# Patient Record
Sex: MALE | Race: WHITE | NOT HISPANIC OR LATINO | URBAN - METROPOLITAN AREA
[De-identification: names, ages, dates, MRNs, and addresses within clinical notes are randomized per-mention and may not be internally consistent; named-entity substitution may affect disease eponyms.]

---

## 2021-08-23 ENCOUNTER — EMERGENCY (EMERGENCY)
Facility: HOSPITAL | Age: 61
LOS: 1 days | Discharge: NURSING, REHAB OR LONG-TERM CARE | End: 2021-08-23
Attending: EMERGENCY MEDICINE | Admitting: EMERGENCY MEDICINE
Payer: MEDICARE

## 2021-08-23 VITALS
DIASTOLIC BLOOD PRESSURE: 82 MMHG | RESPIRATION RATE: 18 BRPM | SYSTOLIC BLOOD PRESSURE: 135 MMHG | HEART RATE: 82 BPM | OXYGEN SATURATION: 99 %

## 2021-08-23 VITALS
TEMPERATURE: 98 F | RESPIRATION RATE: 15 BRPM | SYSTOLIC BLOOD PRESSURE: 137 MMHG | OXYGEN SATURATION: 100 % | DIASTOLIC BLOOD PRESSURE: 78 MMHG | HEART RATE: 90 BPM

## 2021-08-23 PROCEDURE — 99283 EMERGENCY DEPT VISIT LOW MDM: CPT

## 2021-08-23 RX ORDER — TERBINAFINE HCL 250 MG
1 TABLET ORAL
Qty: 30 | Refills: 0
Start: 2021-08-23 | End: 2021-09-21
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
## 2021-08-23 NOTE — ED PROVIDER NOTE - NS ED ROS FT
REVIEW OF SYSTEMS:    CONSTITUTIONAL: No weakness, fevers or chills  EYES/ENT: No visual changes;  No vertigo or throat pain   NECK: No pain or stiffness  BACK: no pain or lesions   RESPIRATORY: No cough, wheezing, hemoptysis; No shortness of breath  CARDIOVASCULAR: No chest pain or palpitations  GASTROINTESTINAL: No abdominal or epigastric pain. No nausea, vomiting, or hematemesis; No diarrhea or constipation. No melena or hematochezia.  GENITOURINARY: No dysuria, frequency or hematuria  NEUROLOGICAL: No numbness or weakness  EXTREMITIES: no varicose veins, no pain in UE and LE  SKIN: No itching. +rash on head
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
## 2021-08-23 NOTE — ED PROVIDER NOTE - PATIENT PORTAL LINK FT
You can access the FollowMyHealth Patient Portal offered by [facility] by registering at the following website: [email or web address]. By joining [employer]’s FollowMyHealth portal, you will also be able to view your health information using other applications (apps) compatible with our system.

## 2021-08-23 NOTE — ED ADULT NURSE NOTE - OBJECTIVE STATEMENT
Pt awake, alert - sent in from assisted facility for cynthia on top of head - on exam, cynthia does not look like a laceration or abrasion - pt denies any discomfort - awaiting MD ortiz and probable discharge Pt awake, alert - staff @ bedside - sent in from assisted facility for cynthia on top of head - on exam, cynthia does not look like a laceration or abrasion - pt denies any discomfort - awaiting MD ortiz and probable discharge

## 2021-08-23 NOTE — ED PROVIDER NOTE - CLINICAL SUMMARY MEDICAL DECISION MAKING FREE TEXT BOX
60 y/o M presenting with nonpruritic erythematous ring like lesion on head likely 2/2 tinea corpis. As lesion is on scalp will send pt home with 4 weeks of terbinafine and PCP f/u. 60 y/o M presenting with nonpruritic erythematous ring like lesion on head likely 2/2 tinea capitis. As lesion is on scalp will send pt home with 4 weeks of terbinafine and PCP f/u.  also d/w aid/staffmember, and documented on facility paperwork that pt should f/u c Derm as he may need further eval and tx to ensure resolution and r/o other etiologies.

## 2021-08-23 NOTE — ED PROVIDER NOTE - ATTENDING CONTRIBUTION TO CARE
Attending Attestation: Dr. Shannon  I have personally performed a history and physical examination of the patient and discussed management with the resident as well as the patient.  I reviewed the resident's note and agree with the documented findings and plan of care.  I have authored and modified critical sections of the Provider Note, including but not limited to HPI, Physical Exam and MDM. 60 y/o M presenting with nonpruritic erythematous ring like lesion on head likely 2/2 tinea capitis. As lesion is on scalp will send pt home with 4 weeks of terbinafine and PCP f/u.  also d/w aid/staffmember, and documented on facility paperwork that pt should f/u c Derm as he may need further eval and tx to ensure resolution and r/o other etiologies.

## 2021-08-23 NOTE — ED PROVIDER NOTE - CARE PLAN
1 Principal Discharge DX:	Rash  Assessment and plan of treatment:	Skin lesion/rash on head looks like ringworm, will treat with 4 weeks of terbinafine

## 2021-08-23 NOTE — ED PROVIDER NOTE - OBJECTIVE STATEMENT
62 y/o M with MR, hyperTG, GERD, hx of pancreatitis and gastritis presenting for rash on head. Facility that patient lives at noticed this rash this morning. Patient denies any pain or pruritis. He denies fevers, chills. 62 y/o M with MR, hyperTG, GERD, hx of pancreatitis and gastritis presenting for rash on head. Facility that patient lives at noticed this rash this morning. Patient denies any pain or pruritis. He denies fevers, chills.  Is in NAD, and unable to provide detailed hx d/t disbaility.

## 2021-08-23 NOTE — ED PROVIDER NOTE - PHYSICAL EXAMINATION
PHYSICAL EXAM:  T(C): 36.6 (08-23-21 @ 18:06), Max: 36.6 (08-23-21 @ 18:06)  HR: 82 (08-23-21 @ 20:43) (82 - 90)  BP: 135/82 (08-23-21 @ 20:43) (135/82 - 137/78)  RR: 18 (08-23-21 @ 20:43) (15 - 18)  SpO2: 99% (08-23-21 @ 20:43) (99% - 100%)    GENERAL: NAD, well-developed  HEAD:  small ring like lesion on head with some satellite ring like lesions, erythematous with dry skin on scalp  EYES: EOMI, conjunctiva and sclera clear  NECK: Supple, No JVD  CHEST/LUNG: Clear to auscultation bilaterally; No wheeze  HEART: Regular rate and rhythm; No murmurs, rubs, or gallops  ABDOMEN: Soft, Nontender, Nondistended; Bowel sounds present  EXTREMITIES:  2+ Peripheral Pulses, No clubbing, cyanosis, or edema  PSYCH: AAOx3  NEUROLOGY: non-focal  SKIN: rash as above

## 2021-08-23 NOTE — ED PROVIDER NOTE - NSFOLLOWUPINSTRUCTIONS_ED_ALL_ED_FT
You were seen in the Emergency Department for lesion on head.     This may be due to a ring worm infection. Please take your antifungal medication once a day for 4 weeks and follow up with your PCP.    1) Advance activity as tolerated.   2) Continue all previously prescribed medications as directed.    3) Follow up with your primary care physician in 24-48 hours - take copies of your results.    4) Return to the Emergency Department for worsening or persistent symptoms, and/or ANY NEW OR CONCERNING SYMPTOMS.

## 2021-09-25 ENCOUNTER — EMERGENCY (EMERGENCY)
Facility: HOSPITAL | Age: 61
LOS: 1 days | Discharge: HOME | End: 2021-09-25
Attending: EMERGENCY MEDICINE | Admitting: EMERGENCY MEDICINE
Payer: MEDICARE

## 2021-09-25 VITALS
SYSTOLIC BLOOD PRESSURE: 161 MMHG | DIASTOLIC BLOOD PRESSURE: 92 MMHG | OXYGEN SATURATION: 100 % | TEMPERATURE: 98 F | HEART RATE: 68 BPM | RESPIRATION RATE: 17 BRPM

## 2021-09-25 VITALS
SYSTOLIC BLOOD PRESSURE: 128 MMHG | RESPIRATION RATE: 16 BRPM | DIASTOLIC BLOOD PRESSURE: 74 MMHG | HEART RATE: 61 BPM | TEMPERATURE: 98 F | OXYGEN SATURATION: 100 %

## 2021-09-25 PROCEDURE — 99284 EMERGENCY DEPT VISIT MOD MDM: CPT

## 2021-09-25 NOTE — ED ADULT NURSE NOTE - OBJECTIVE STATEMENT
Pt a&ox3 from [facility] for one episode of vomiting while eating dinner. Staff at bedside. Pt abdomen soft, non distended, non tender. Pt offers no complaints. PO challenge then DC as per MD

## 2021-09-25 NOTE — ED ADULT NURSE NOTE - NSIMPLEMENTINTERV_GEN_ALL_ED
Implemented All Universal Safety Interventions:  [place] to call system. Call bell, personal items and telephone within reach. Instruct patient to call for assistance. Room bathroom lighting operational. Non-slip footwear when patient is off stretcher. Physically safe environment: no spills, clutter or unnecessary equipment. Stretcher in lowest position, wheels locked, appropriate side rails in place.

## 2021-09-25 NOTE — ED PROVIDER NOTE - OBJECTIVE STATEMENT
60 y/o M with MR, hyperTG, GERD, hx of pancreatitis and gastritis presenting for nausea/vomiting during dinner tonight, lasted a few minutes but spontaneously resolved. since then abdomen feeling well, no fevers/chills, urinary symptoms, tolerating PO.

## 2021-09-25 NOTE — ED PROVIDER NOTE - PRO INTERPRETER NEED 2
Last office visit: 10/14/19    No upcoming visit     Last filled on 9/9/19    Patient uses prn for migraines.     Ok to refill, please advise   English

## 2021-09-25 NOTE — ED PROVIDER NOTE - ATTENDING CONTRIBUTION TO CARE
Attending note:   After face to face evaluation of this patient, I concur with above noted hx, pe, and care plan for this patient.  Lebron: 61 yom with hx of pancreatitis and diverticulitis sent to ED for episodes of nausea and vomiting after eating chicken and rice for dinner. Pt denies abdominal pain and now no longer with nausea and vomiting. Pt is well appearing, no distress, clear lungs, RRR, abd soft and non tn, +BS, no edema. Aide with patient states he is at baseline. Pt appears to have had an episode of n/v that has resolved and is now tolerating PO, will observe but if at baseline would not pursue further.

## 2021-09-25 NOTE — ED PROVIDER NOTE - NSFOLLOWUPINSTRUCTIONS_ED_ALL_ED_FT
You were seen in the Emergency Department for abdominal pain.   Today you had a clinical exam which was normal.     1) Advance activity as tolerated.    2) Continue all previously prescribed medications as directed.    3) Follow up with your primary care physician in 24-48 hours - take copies of your results.    4) Return to the Emergency Department for worsening or persistent symptoms, and/or ANY NEW OR CONCERNING SYMPTOMS as described below.    You were seen today in the emergency room for abdominal pain. Although the testing done today indicates that your pain is not from an acute emergency, your pain could still represent a more serious problem. Most commonly, the pain you are having is likely not something serious and will resolve in a few days, however testing was done today based on the symptoms that you currently have; so if you develop new or worsening symptoms this could be a sign of a problem that was not tested for and means you should come back to the emergency room or see your doctor urgently. You need to follow up with your doctor in the next 48-72 hours. If you develop any new or worsening symptoms you need to return immediately to the emergency department. If you experience any of the following please come right back to the emergency room: severe nausea and vomiting with inability to tolerate eating, severe worsening of your pain, a new fever, new bleeding in stool or vomit, confusion, new numbness or weakness, passing out.

## 2021-09-25 NOTE — ED PROVIDER NOTE - PATIENT PORTAL LINK FT
You can access the FollowMyHealth Patient Portal offered by [facility] by registering at the following website: [email or web address]. By joining [employer]’s FollowMyHealth portal, you will also be able to view your health information using other applications (apps) compatible with our system.

## 2021-09-25 NOTE — ED PROVIDER NOTE - CLINICAL SUMMARY MEDICAL DECISION MAKING FREE TEXT BOX
62 y/o M with MR, hyperTG, GERD, hx of pancreatitis and gastritis presents with abdominal, pex benign, tolerating po.

## 2021-09-25 NOTE — ED ADULT NURSE REASSESSMENT NOTE - NS ED NURSE REASSESS COMMENT FT1
Received report from day shift RN. Patient received A&Ox4 with 20G IV, group home attendant at the bedside. Pt offering no complaints and is in no acute distress at this time.  Respirations even and unlabored.  PO challenge in progress as per MD; denies complaints of N+V currently. Stretcher in lowest position, wheels locked, side rails up as per protocol. Vital signs are per flowsheet.

## 2021-09-25 NOTE — ED PROVIDER NOTE - PHYSICAL EXAMINATION
[Const] well-appearing, resting comfortably, no acute distress  [HEENT] PERRL, EOMI, moist mucus membranes  [Neck] Supple, trachea midline  [CV] +S1/S2, no m/r/g appreciated  [Lungs] Clear to auscultations bilaterally, no adventitious lung sounds  [Abd] soft, non-tender, nondistended in all 4 quadrants  [MSK] 5/5 upper extremity and lower extremity str bilaterally  [Skin] warm, dry, well-perfused  [Neuro] A&Ox3, gait intact, CN II-XII intact

## 2021-09-26 PROBLEM — K21.9 GASTRO-ESOPHAGEAL REFLUX DISEASE WITHOUT ESOPHAGITIS: Chronic | Status: ACTIVE | Noted: 2021-08-23

## 2021-09-26 PROBLEM — K57.90 DIVERTICULOSIS OF INTESTINE, PART UNSPECIFIED, WITHOUT PERFORATION OR ABSCESS WITHOUT BLEEDING: Chronic | Status: ACTIVE | Noted: 2021-08-23

## 2022-01-30 ENCOUNTER — EMERGENCY (EMERGENCY)
Facility: HOSPITAL | Age: 62
LOS: 1 days | Discharge: HOME | End: 2022-01-30
Attending: EMERGENCY MEDICINE | Admitting: EMERGENCY MEDICINE
Payer: MEDICARE

## 2022-01-30 VITALS
HEART RATE: 101 BPM | OXYGEN SATURATION: 98 % | RESPIRATION RATE: 18 BRPM | DIASTOLIC BLOOD PRESSURE: 74 MMHG | TEMPERATURE: 98 F | SYSTOLIC BLOOD PRESSURE: 114 MMHG

## 2022-01-30 PROCEDURE — 73030 X-RAY EXAM OF SHOULDER: CPT | Mod: 26,RT

## 2022-01-30 PROCEDURE — 99284 EMERGENCY DEPT VISIT MOD MDM: CPT
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
## 2022-01-30 NOTE — ED PROVIDER NOTE - PATIENT PORTAL LINK FT
You can access the FollowMyHealth Patient Portal offered by [facility] by registering at the following website: [email or web address]. By joining [employer]’s FollowMyHealth portal, you will also be able to view your health information using other applications (apps) compatible with our system.

## 2022-01-30 NOTE — ED PROVIDER NOTE - NSFOLLOWUPINSTRUCTIONS_ED_ALL_ED_FT
Advance activity as tolerated.  Continue all previously prescribed medications as directed unless otherwise instructed.  Follow up with your primary care physician in 48-72 hours- bring copies of your results.  Return to the ER for worsening or persistent symptoms, and/or ANY NEW OR CONCERNING SYMPTOMS. If you have issues obtaining follow up, please call: [phone] to obtain a doctor or specialist who takes your insurance in your area.  You may call [phone] to make an appointment with the internal medicine clinic.     Take Tylenol 650mg (Two 325 mg pills) every 4-6 hours as needed for pain     Apply bacitracin (available over the counter) twice daily to affected area until healed.
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
## 2022-05-04 ENCOUNTER — EMERGENCY (EMERGENCY)
Facility: HOSPITAL | Age: 62
LOS: 1 days | Discharge: HOME | End: 2022-05-04
Admitting: EMERGENCY MEDICINE
Payer: MEDICARE

## 2022-05-04 VITALS
OXYGEN SATURATION: 98 % | DIASTOLIC BLOOD PRESSURE: 67 MMHG | HEART RATE: 86 BPM | SYSTOLIC BLOOD PRESSURE: 119 MMHG | RESPIRATION RATE: 16 BRPM

## 2022-05-04 PROCEDURE — 99283 EMERGENCY DEPT VISIT LOW MDM: CPT

## 2022-05-04 RX ORDER — TRIAMCINOLONE 4 MG
30 TABLET ORAL ONCE
Refills: 0 | Status: DISCONTINUED | OUTPATIENT
Start: 2022-05-04 | End: 2022-05-08

## 2022-05-04 NOTE — ED PROVIDER NOTE - OBJECTIVE STATEMENT
63 Y/O M w/ PMH of HTN, Mental illness presents w/ aide from group home for rash to scalp. PT is limited verbally at baseline. Speaking tangentially and pressured. Per aide has developed red rash to scalp over the past few days. Sent to ER for further evaluation. No fever, tolerating oral intake and no changes from baseline behavior. Denies nausea/vomiting, dizziness, headache, chest pain, shortness of breath or fever

## 2022-05-04 NOTE — ED PROVIDER NOTE - NS ED ROS FT
Constitutional: (-) fever   Head: Normal cephalic, Atraumatic  Eyes/ENT: (-) vision changes, (-) hearing chnages  Cardiovascular: (-) chest pain, (-) wheezing  Respiratory: (-) cough, (-) shortness of breath  Gastrointestinal: (-) vomiting, (-) diarrhea, (-) abdominal pain  : (-) dysuria   Musculoskeletal: (-) back pain  Integumentary: (+) scalp rash, (-) edema  Neurological: (-)loc  Allergic/Immunologic: (-) pruritus
Fall

## 2022-05-04 NOTE — ED PROVIDER NOTE - CLINICAL SUMMARY MEDICAL DECISION MAKING FREE TEXT BOX
61 Y/O M w/ PMH of HTN, Mental illness presents w/ aide from group home for rash to scalp.  Discussed likely diagnosis of psoriasis  Avoid hat wearing. Recommend steroids and Derm follow up  Return precuations given

## 2022-05-04 NOTE — ED PROVIDER NOTE - PHYSICAL EXAMINATION
Vital signs reviewed.   CONSTITUTIONAL: Well-appearing; well-nourished; in no apparent distress. Non-toxic appearing.   HEAD: Normocephalic, atraumatic.  EYES: PERRL, EOM intact, conjunctiva and no sclera injection noted  ENT: normal nose; no rhinorrhea; normal pharynx with no tonsillar hypertrophy, no erythema, no exudate, no lymphadenopathy.  NECK/LYMPH: Supple; non-tender; no cervical lymphadenopathy.  CARD: Normal S1, S2  RESP: Normal chest excursion with respiration; breath sounds clear and equal bilaterally; no wheezes, rhonchi, or rales.  ABD/GI: soft, non-distended; non-tender; no palpable organomegaly, no pulsatile mass.  EXT/MS: moves all extremities; distal pulses are normal, no pedal edema.  SKIN: Normal for age and race; warm; dry; good turgor; no apparent lesions or exudate noted.  NEURO: Awake, alert, oriented x 3, no gross deficits, CN II-XII grossly intact, no motor or sensory deficit noted.  PSYCH: Normal mood; appropriate affect. Vital signs reviewed.   CONSTITUTIONAL: Well-appearing; well-nourished; in no apparent distress. Non-toxic appearing.   HEAD:  Plaques of various sizes coalescing along posterior of head. No tender. Non-blanchable and non-cellulitic. No drainage noted.  Normocephalic, atraumatic.  EYES: PERRL, EOM intact, conjunctiva and no sclera injection noted  ENT: normal nose; no rhinorrhea;  CARD: Normal S1, S2  RESP: Normal chest excursion with respiration; breath sounds clear and equal bilaterally  ABD/GI: soft, non-distended; non-tender; no palpable organomegaly, no pulsatile mass.  EXT/MS: moves all extremities; distal pulses are normal, no pedal edema.  SKIN: Normal for age and race; warm; dry; good turgor; no apparent lesions or exudate noted.  NEURO: At Baseline  PSYCH: Normal mood; appropriate affect.

## 2022-05-04 NOTE — ED ADULT TRIAGE NOTE - CHIEF COMPLAINT QUOTE
pt sent from [facility] for human services with staff. as per [facility] paper work pt sent for red marks to posterior side of head. PMH MR, HDL, GERD, pancreatitis, gastritis, dysphagia. redness noted to posterior side of head. pt non compliant with assessment questions. unable to obtain temperature in triage.

## 2022-05-04 NOTE — ED PROVIDER NOTE - NSFOLLOWUPINSTRUCTIONS_ED_ALL_ED_FT
What is psoriasis? Psoriasis is a long-term skin disease in which the skin cells grow faster than normal. This abnormal growth causes a buildup of cells on the surface of the skin. Red, raised patches that are covered with silver-colored scales form on your skin.     What causes psoriasis? The exact cause of psoriasis is not known. A problem with the immune system sometimes causes your body to attack healthy skin cells. Psoriasis is more likely to occur if another family member also has psoriasis. Flare-ups of psoriasis come and go and are often caused by certain triggers.   •Infections: Germs, such as bacteria, viruses, or fungi, may trigger a flare-up. A flare-up of psoriasis usually follows a sore throat.      •Medicines: Certain medicines, such as those used to treat high blood pressure or depression, may trigger a psoriasis flare-up.  •Skin damage: Skin injuries, such as a cut or scrape, or a sunburn may increase your risk of a flare-up.  •Smoking and alcohol: Smoking and drinking alcohol may also increase your risk.   •Stress: Both physical and emotional stress may lead to a flare-up of psoriasis.    What are the signs and symptoms of psoriasis? The signs and symptoms usually depend on the type of psoriasis you have.   •Plaque type: This is the most common and mildest type of psoriasis. Plaques are reddened patches covered with silver-colored scales. Your knees, elbows, scalp, stomach, and back are usually affected. You may also have nail changes, such as pitting, thickening, or lifting of the nails off the nail bed.  •Guttate type: This type is the most common among children and young adults. It usually happens after a sore throat or other infections. This type looks like red, raised, pea-sized drops on your skin.  •Inverse type: The plaques appear as smooth red patches and are often found in the moist areas of your body. It affects skin in the armpits, groin, under breasts, and around the genitals.  •Erythrodermic type: This is a rare and severe type of psoriasis in which plaques cover large areas of the skin. These areas itch and are painful.  •Pustular type: Pustules (blisters with pus inside) or pimple-like lesions may appear on large red areas of the skin. Sometimes this type is limited to the palms of the hands and soles of the feet.  •Psoriatic arthritis: Some people who have psoriasis may also develop psoriatic arthritis. Psoriatic arthritis makes your joints swollen and painful. You may also have nail changes, such as pitting, thickening, or lifting of the nails off the nail bed.      How is psoriasis diagnosed? Your healthcare provider will ask about your health history. He may also want to know if you have other family members who have psoriasis. Psoriasis is usually diagnosed after a careful examination of your skin, scalp, and nails. Blood tests and x-rays may also be needed.     How is psoriasis treated? Treatment usually depends on the severity of the disease, size of the areas involved, and the type of psoriasis.   •Topical medicine: These medicines are ointments, creams, and pastes that are applied on the skin.?Moisturizers: These soothe your skin by keeping it moist and preventing dryness.  ?Steroids may be given to decrease inflammation.  ?Vitamin D and retinoids: These are vitamin-based creams that are used to clear plaques.  ?Anthralin: This medicine decreases swelling and excess skin cells that form scales.  ?Salicylic acid: This peeling agent helps decrease scaling of the skin and scalp.  ?Tar preparations: These medicines decrease itching, scaling, and inflammation. They may be shampoos, creams, or bath oils.  •Oral medicine: These medicines are used to treat serious types of psoriasis and are taken by mouth. They include steroids or retinoids. They may also include medicines that decrease the rate of growth of your skin cells or that affect your immune system.  •Phototherapy: You may need ultraviolet (UV) light treatments if your psoriasis is severe. Your skin is exposed to UV light for the period of time that your healthcare provider prescribes.    What are the risks of psoriasis? Certain medicines used to treat psoriasis can cause burning, redness, and irritation of your skin. They can also cause drowsiness, high blood pressure, or birth defects. Without treatment, your signs and symptoms may worsen. Psoriasis may cause severe itching, swelling, and infection. You may also bleed more easily.    How can I manage my psoriasis?   •Take care of your skin: Apply emollients, lubricants, or moisturizing creams to your skin regularly. Apply these while your skin is still damp when you bathe. Stop using them if they sting or irritate your skin. Use mild soaps and add bath oils to soothe your skin when you bathe.   •Protect your skin from sun exposure: When you get sun exposure for short periods of time, it can help your psoriasis. Too much sun exposure or a sunburn can make your psoriasis worse. Talk to your dermatologist or healthcare provider about how much sun exposure is right for you.   •Manage stress: Stress can trigger a flare-up. Find healthy ways to manage stress, such as deep breathing or meditation.  •Watch for symptoms with new medicines or herbal supplements: Some medicines, including herbal supplements, may trigger a psoriasis flare-up. Ask if any of the medicines you take may be making your psoriasis worse. Always check for skin changes when you take your medicines.  •Do not smoke: Smoking can trigger a flare-up of psoriasis. If you smoke, it is never too late to quit. Ask for information about how to stop.  •Avoid triggers: Injury to the skin, cold weather, and heavy alcohol use are other things that can trigger psoriasis flare-ups.     When should I contact my healthcare provider?   •You get pregnant.  •You have a fever.  •Your skin plaques are not getting better or are getting worse.  •You cannot sleep because your skin itches.  •Your skin plaques have pus draining from them or they have soft yellow scabs.  •You have questions or concerns about your condition or care.    When should I seek immediate care?   •Psoriasis suddenly covers larger areas of your body and becomes more painful.  Do not wear hat

## 2022-05-04 NOTE — ED PROVIDER NOTE - PATIENT PORTAL LINK FT
You can access the FollowMyHealth Patient Portal offered by [facility] by registering at the following website: [email or web address]. By joining [employer]’s FollowMyHealth portal, you will also be able to view your health information using other applications (apps) compatible with our system.

## 2022-05-26 ENCOUNTER — EMERGENCY (EMERGENCY)
Facility: HOSPITAL | Age: 62
LOS: 1 days | Discharge: HOME | End: 2022-05-26
Attending: EMERGENCY MEDICINE | Admitting: EMERGENCY MEDICINE
Payer: MEDICARE

## 2022-05-26 VITALS
HEART RATE: 104 BPM | RESPIRATION RATE: 15 BRPM | OXYGEN SATURATION: 100 % | TEMPERATURE: 97 F | SYSTOLIC BLOOD PRESSURE: 106 MMHG | DIASTOLIC BLOOD PRESSURE: 84 MMHG

## 2022-05-26 VITALS — HEART RATE: 92 BPM

## 2022-05-26 PROCEDURE — 99284 EMERGENCY DEPT VISIT MOD MDM: CPT

## 2022-05-26 NOTE — ED ADULT NURSE NOTE - OBJECTIVE STATEMENT
Pt arrives ambulatory with group home staff to triage. Pt endorses left arm and right knee pain, as well as dysuria. Pt appears irritated in triage, needs frequent re-direction from group home staff. PMH: developmental delay, HLD.  Patient has aide at bedside. Pt being evaluated by MD. Bedside Ultrasound in progress. Pt waiting for results, further orders and disposition.    TOBIAS Bautista

## 2022-05-26 NOTE — ED ADULT TRIAGE NOTE - CHIEF COMPLAINT QUOTE
Pt arrives ambulatory with group home staff to triage. Pt endorses left arm and right knee pain, as well as dysuria. Pt appears irritated in triage. PMH: developmental delay, HLD. Pt arrives ambulatory with group home staff to triage. Pt endorses left arm and right knee pain, as well as dysuria. Pt appears irritated in triage, needs frequent re-direction from group home staff. PMH: developmental delay, HLD.
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
## 2022-05-26 NOTE — ED PROVIDER NOTE - PATIENT PORTAL LINK FT
You can access the FollowMyHealth Patient Portal offered by [facility] by registering at the following website: [email or web address]. By joining [email or web address]’s FollowMyHealth portal, you will also be able to view your health information using other applications (apps) compatible with our system.

## 2022-05-26 NOTE — ED PROVIDER NOTE - PHYSICAL EXAMINATION
LUE:  no bony tenderness; no swelling, no redness, no warmth LUE:  no bony tenderness; no swelling, no redness, no warmth, no crepitus; FROM, 5/5 strength; sensation intact; < 2 sec capillary refill; 2+ pulses  LLE:  no bony tenderness; no swelling, no redness, no warmth, no crepitus; FROM, 5/5 strength; sensation intact; < 2 sec capillary refill; 2+ pulses

## 2022-05-26 NOTE — ED PROVIDER NOTE - NS ED ATTENDING STATEMENT MOD
This was a shared visit with the NANCY. I reviewed and verified the documentation and independently performed the documented:

## 2022-05-26 NOTE — ED PROVIDER NOTE - NSFOLLOWUPINSTRUCTIONS_ED_ALL_ED_FT
Advance activity as tolerated.  Continue all previously prescribed medications as directed unless otherwise instructed.  Follow up with your primary care physician in 48-72 hours- bring copies of your results.  Return to the ER for worsening or persistent symptoms, and/or ANY NEW OR CONCERNING SYMPTOMS. If you have issues obtaining follow up, please call: [phone] to obtain a doctor or specialist who takes your insurance in your area.  You may call [phone] to make an appointment with the internal medicine clinic.

## 2022-05-26 NOTE — ED PROVIDER NOTE - PROGRESS NOTE DETAILS
MELISSA SULLIVAN:  Bedside PVR negative for urinary retention.  Pt medically stable for discharge.  Pt has been ambulating independently without difficulty.  Pt has been calm and cooperative during ED stay.  Strict return precautions given.  Pt to follow up with PMD.  Reassessment performed and plan for discharge discussed with Dr. Haas who agrees with disposition and discharge plan.
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
## 2022-05-26 NOTE — ED ADULT NURSE NOTE - CHIEF COMPLAINT QUOTE
Pt arrives ambulatory with group home staff to triage. Pt endorses left arm and right knee pain, as well as dysuria. Pt appears irritated in triage, needs frequent re-direction from group home staff. PMH: developmental delay, HLD.

## 2022-11-28 ENCOUNTER — EMERGENCY (EMERGENCY)
Facility: HOSPITAL | Age: 62
LOS: 1 days | Discharge: HOME | End: 2022-11-28
Attending: EMERGENCY MEDICINE | Admitting: EMERGENCY MEDICINE

## 2022-11-28 VITALS
SYSTOLIC BLOOD PRESSURE: 145 MMHG | RESPIRATION RATE: 18 BRPM | HEART RATE: 85 BPM | DIASTOLIC BLOOD PRESSURE: 65 MMHG | TEMPERATURE: 98 F | OXYGEN SATURATION: 98 %

## 2022-11-28 PROCEDURE — 99283 EMERGENCY DEPT VISIT LOW MDM: CPT
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
## 2022-11-29 RX ORDER — ACETAMINOPHEN 500 MG
650 TABLET ORAL ONCE
Refills: 0 | Status: COMPLETED | OUTPATIENT
Start: 2022-11-29 | End: 2022-11-29

## 2022-11-29 RX ADMIN — Medication 650 MILLIGRAM(S): at 00:53
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
## 2022-11-29 NOTE — ED ADULT NURSE NOTE - TEMPLATE
General Cimetidine Counseling:  I discussed with the patient the risks of Cimetidine including but not limited to gynecomastia, headache, diarrhea, nausea, drowsiness, arrhythmias, pancreatitis, skin rashes, psychosis, bone marrow suppression and kidney toxicity.

## 2022-11-29 NOTE — ED PROVIDER NOTE - PATIENT PORTAL LINK FT
You can access the FollowMyHealth Patient Portal offered by [facility] by registering at the following website: [email or web address]. By joining [email or web address]’s FollowMyHealth portal, you will also be able to view your health information using other applications (apps) compatible with our system.
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
## 2022-11-29 NOTE — ED PROVIDER NOTE - NSFOLLOWUPINSTRUCTIONS_ED_ALL_ED_FT
Please see your primary doctor in 2-3 days for follow-up care. Return to ER for any new or worsening symptoms including nausea or vomiting, or passing out.  You may take 500-1000 mg acetaminophen every 6 hours, as needed for pain  Please take it easy for the next few days. Stay hydrated by drinking plenty of water.   Please read the handout on concussion. Please see your primary doctor in 2-3 days for follow-up care. Return to ER for any new or worsening symptoms including nausea or vomiting, or passing out.  You may take 500-1000 mg acetaminophen every 6 hours, as needed for pain  Please take it easy for the next few days. Stay hydrated by drinking plenty of water.   Please intermittently apply ice packs over the affected area.

## 2022-11-29 NOTE — ED PROVIDER NOTE - PHYSICAL EXAMINATION
Const: Pt pacing in room and speaking nonsensically, baseline  Eyes: mild left eye injection, b/l EOMI, PERRL.  HEENT: No ttp around orbit or on head.   Neck: Symmetric, trachea midline.   RESP: Unlabored respiratory effort.  GI: Nontender/Nondistended,   MSK: Normocephalic/Atraumatic. no midline vertebral tenderness.   Skin:  Bruising around eye l eye  Psych: Awake, Alert, & Oriented (AAO) x2.
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
## 2024-06-26 NOTE — ED PROVIDER NOTE - TOBACCO USE
[facility]  Hand and Upper Extremity Service  Follow up visit         Follow up for: Bilateral hands     Interval History: He received bilateral carpal tunnel injections on last visit which resulted in several months of relief but he now notices his symptoms are returning. His right hand is worse than the left and notes injections don't give him significant improvement. He's interested in repeat injections today but would like to proceed with surgery later this fall.               Past medical history, medications, allergies, surgical history and review of systems are reviewed and otherwise unchanged when compared to last visit on 9/19/23         Examination:  Constitutional: Oriented to person, place, and time.  Appears well-developed and well-nourished.  Head: Normocephalic and atraumatic.  Eyes: Pupils are equal, round, and reactive to light.  Cardiovascular: Intact distal pulses.  Pulmonary/Chest/Breast: Effort normal. No respiratory distress.  Neurological: Alert and oriented to person, place, and time.  Skin: Skin is warm and dry.  Psychiatric: normal mood and affect.  Behavior is normal.  Musculoskeletal: Bilateral hands reveal normal appearance. No thenar or intrinsic atrophy. No triggering. Diminished sensation in median nerve distribution. Positive Shyanne median nerve compression test.       Personal Interpretation of Diagnostic studies: No new images obtained       Impression: Bilateral carpal tunnel syndrome       Plan: He got bilateral carpal tunnel injections today and he'll call to schedule right carpal tunnel release under local anesthesia later this fall.       In Office Procedures Performed: Bilateral carpal tunnel injections  Hand / UE Inj/Asp: bilateral carpal tunnel for carpal tunnel syndrome on 6/27/2024 3:22 PM  Indications: pain and therapeutic  Details: 25 G needle, volar approach  Medications (Right): 20 mg triamcinolone acetonide 40 mg/mL; 0.5 mL  lidocaine 10 mg/mL (1 %)  Medications (Left): 20 mg triamcinolone acetonide 40 mg/mL; 0.5 mL lidocaine 10 mg/mL (1 %)  Outcome: tolerated well, no immediate complications  Procedure, treatment alternatives, risks and benefits explained, specific risks discussed. Consent was given by the patient. Immediately prior to procedure a time out was called to verify the correct patient, procedure, equipment, support staff and site/side marked as required. Patient was prepped and draped in the usual sterile fashion.             Follow up: Will call to schedule surgery     For Surgical Planning:  Diagnosis: Right carpal tunnel  Procedure: Right carpal tunnel release  CPT: 86513  Anesthesia: Local only  Duration: 20 minutes  Special Equipment Needed: None  Medical Notes / PM / DM / PAT needed?:  N/A  Location: [facility]  Initial Post Operative Visit: 2 weeks          Carl Marx MD  [facility]  Department of Orthopaedic Surgery  Hand and Upper Extremity Reconstruction    Scribe Attestation  By signing my name below, I, Cristiane Velez , Scribe   attest that this documentation has been prepared under the direction and in the presence of Dr. Carl Marx.    Dictation performed with the use of voice recognition software.  Syntax and grammatical errors may exist.   Never smoker
